# Patient Record
Sex: MALE | Race: WHITE | NOT HISPANIC OR LATINO | Employment: FULL TIME | URBAN - METROPOLITAN AREA
[De-identification: names, ages, dates, MRNs, and addresses within clinical notes are randomized per-mention and may not be internally consistent; named-entity substitution may affect disease eponyms.]

---

## 2022-10-30 ENCOUNTER — OFFICE VISIT (OUTPATIENT)
Dept: URGENT CARE | Facility: URGENT CARE | Age: 42
End: 2022-10-30
Payer: COMMERCIAL

## 2022-10-30 VITALS
OXYGEN SATURATION: 97 % | RESPIRATION RATE: 15 BRPM | TEMPERATURE: 97.2 F | DIASTOLIC BLOOD PRESSURE: 84 MMHG | SYSTOLIC BLOOD PRESSURE: 110 MMHG | BODY MASS INDEX: 22.46 KG/M2 | HEART RATE: 94 BPM | WEIGHT: 175 LBS | HEIGHT: 74 IN

## 2022-10-30 DIAGNOSIS — R50.9 FEVER, UNSPECIFIED: ICD-10-CM

## 2022-10-30 DIAGNOSIS — J10.1 INFLUENZA A: Primary | ICD-10-CM

## 2022-10-30 LAB
FLUAV AG SPEC QL IA: POSITIVE
FLUBV AG SPEC QL IA: NEGATIVE

## 2022-10-30 PROCEDURE — 87804 INFLUENZA ASSAY W/OPTIC: CPT | Performed by: INTERNAL MEDICINE

## 2022-10-30 PROCEDURE — 87804 INFLUENZA ASSAY W/OPTIC: CPT | Mod: 59 | Performed by: INTERNAL MEDICINE

## 2022-10-30 PROCEDURE — 99203 OFFICE O/P NEW LOW 30 MIN: CPT | Performed by: INTERNAL MEDICINE

## 2022-10-30 RX ORDER — OSELTAMIVIR PHOSPHATE 75 MG/1
75 CAPSULE ORAL 2 TIMES DAILY
Qty: 10 CAPSULE | Refills: 0 | Status: SHIPPED | OUTPATIENT
Start: 2022-10-30 | End: 2022-11-04

## 2022-10-30 RX ORDER — IBUPROFEN 200 MG
200 TABLET ORAL EVERY 4 HOURS PRN
COMMUNITY

## 2022-10-30 NOTE — PROGRESS NOTES
"  Assessment & Plan     Influenza A  - oseltamivir (TAMIFLU) 75 MG capsule; Take 1 capsule (75 mg) by mouth 2 times daily for 5 days    Fever, unspecified  - Influenza A/B antigen    Héctor Funes MD  [facility] [place]    Yaniv Pozo is a 41 year old, presenting for the following health issues:  Urgent Care and URI (Flu going around his kids school. Symptoms started Friday, aches and pain. Fever and feeling worse today. Home Covid and PCR test neg. )      HPI   Chief complaint of fever up to 101.  This has been going on for several days.  Associated night sweats and myalgias.  Associated nasal congestion and runny nose.  Home COVID negative x 2 and PCR negative from [facility].  Positive influenza A in his son's friend.  Associated sore throat and cough.     Review of Systems   Constitutional, HEENT, cardiovascular, pulmonary, gi and gu systems are negative, except as otherwise noted.      Objective    /84   Pulse 94   Temp 97.2  F (36.2  C) (Temporal)   Resp 15   Ht 1.88 m (6' 2\")   Wt 79.4 kg (175 lb)   SpO2 97%   BMI 22.47 kg/m    Body mass index is 22.47 kg/m .  Physical Exam   GENERAL APPEARANCE: healthy, alert and no distress  HENT: ear canals and TM's normal and cobblestoning of the posterior pharynx with post-nasal drainage   NECK: no adenopathy, no asymmetry, masses, or scars  RESP: lungs clear to auscultation - no rales, rhonchi or wheezes  CV: regular rates and rhythm, normal S1 S2, no S3 or S4 and no murmur, click or rub    Results for orders placed or performed in visit on 10/30/22 (from the past 24 hour(s))   Influenza A/B antigen    Specimen: Nasopharyngeal; Swab   Result Value Ref Range    Influenza A antigen Positive (A) Negative    Influenza B antigen Negative Negative    Narrative    Test results must be correlated with clinical data. If necessary, results should be confirmed by a molecular assay or viral culture.               "

## 2025-03-30 ENCOUNTER — OFFICE VISIT (OUTPATIENT)
Dept: URGENT CARE | Facility: URGENT CARE | Age: 45
End: 2025-03-30

## 2025-03-30 VITALS
RESPIRATION RATE: 18 BRPM | OXYGEN SATURATION: 96 % | SYSTOLIC BLOOD PRESSURE: 127 MMHG | BODY MASS INDEX: 23.75 KG/M2 | HEART RATE: 71 BPM | TEMPERATURE: 98.3 F | DIASTOLIC BLOOD PRESSURE: 81 MMHG | WEIGHT: 185 LBS

## 2025-03-30 DIAGNOSIS — L50.9 URTICARIA: ICD-10-CM

## 2025-03-30 DIAGNOSIS — L01.00 IMPETIGO: Primary | ICD-10-CM

## 2025-03-30 PROCEDURE — 3074F SYST BP LT 130 MM HG: CPT

## 2025-03-30 PROCEDURE — 99213 OFFICE O/P EST LOW 20 MIN: CPT

## 2025-03-30 PROCEDURE — 3079F DIAST BP 80-89 MM HG: CPT

## 2025-03-30 RX ORDER — CEPHALEXIN 500 MG/1
500 CAPSULE ORAL 4 TIMES DAILY
Qty: 28 CAPSULE | Refills: 0 | Status: SHIPPED | OUTPATIENT
Start: 2025-03-30 | End: 2025-04-06

## 2025-03-30 RX ORDER — CETIRIZINE HYDROCHLORIDE 10 MG/1
10 TABLET ORAL DAILY
Qty: 30 TABLET | Refills: 1 | Status: SHIPPED | OUTPATIENT
Start: 2025-03-30

## 2025-03-30 NOTE — PROGRESS NOTES
Assessment & Plan     Impetigo  Patient presents with erythematous raised lesions over his right arm as well as his left-sided face and small amount over his torso.  Right medial upper arm was initial location approximately 1 week ago which he states appeared as hives.  On exam today, erythematous macules with pustules and crusting present on the right upper and forearm.  This appears to be impetigo, possibly as a result of urticaria.  - cephALEXin (KEFLEX) 500 MG capsule; Take 1 capsule (500 mg) by mouth 4 times daily for 7 days.    Urticaria  - cetirizine (ZYRTEC) 10 MG tablet; Take 1 tablet (10 mg) by mouth daily.    We discussed limiting the use of topical corticosteroids or any other topical treatment as this can result in further spread of the infection        Return if symptoms worsen or fail to improve.      Yaniv Pozo is a 44 year old, presenting for the following health issues:  Urgent Care (- Hives (All Over)/- Started Wed/- Began on Rt. Forearm/- OTC Anit-Itch Cream for symptoms)        3/30/2025    12:05 PM   Additional Questions   Roomed by Enrique MENJIVAR   Accompanied by None     HPI      Patient reports that he initially developed a hive-like rash over his medial right upper arm and then some spread to his right forearm a bit over his left cheek.    He has some history of seasonal allergies, no known severe allergies or anaphylaxis.    He denies any wheezing, shortness of breath recently.                  Objective    /81   Pulse 71   Temp 98.3  F (36.8  C) (Temporal)   Resp 18   Wt 83.9 kg (185 lb)   SpO2 96%   BMI 23.75 kg/m    Body mass index is 23.75 kg/m .  Physical Exam  Constitutional:       General: He is not in acute distress.     Appearance: Normal appearance. He is not ill-appearing.   Cardiovascular:      Rate and Rhythm: Normal rate and regular rhythm.   Pulmonary:      Effort: Pulmonary effort is normal. No respiratory distress.      Breath sounds: Normal breath sounds. No  stridor. No wheezing or rhonchi.   Skin:     General: Skin is warm and dry.      Comments: Scattered areas of erythematous pustules and blistering, worst over right arm   Neurological:      Mental Status: He is alert.                    Signed Electronically by: Sal Preciado DO

## 2025-03-30 NOTE — PROGRESS NOTES
Urgent Care Clinic Visit    Chief Complaint   Patient presents with    Urgent Care     - Hives (All Over)  - Started Wed  - Began on Rt. Forearm  - OTC Anit-Itch Cream for symptoms               3/30/2025    12:05 PM   Additional Questions   Roomed by Enrique MENJIVAR   Accompanied by Lizandro